# Patient Record
Sex: MALE | Race: WHITE | NOT HISPANIC OR LATINO | URBAN - METROPOLITAN AREA
[De-identification: names, ages, dates, MRNs, and addresses within clinical notes are randomized per-mention and may not be internally consistent; named-entity substitution may affect disease eponyms.]

---

## 2018-05-02 ENCOUNTER — APPOINTMENT (OUTPATIENT)
Dept: URBAN - METROPOLITAN AREA SURGERY 9 | Age: 28
Setting detail: DERMATOLOGY
End: 2018-05-02

## 2018-05-02 DIAGNOSIS — B07.8 OTHER VIRAL WARTS: ICD-10-CM

## 2018-05-02 DIAGNOSIS — L72.0 EPIDERMAL CYST: ICD-10-CM

## 2018-05-02 DIAGNOSIS — A63.0 ANOGENITAL (VENEREAL) WARTS: ICD-10-CM

## 2018-05-02 PROBLEM — L20.84 INTRINSIC (ALLERGIC) ECZEMA: Status: ACTIVE | Noted: 2018-05-02

## 2018-05-02 PROBLEM — L40.0 PSORIASIS VULGARIS: Status: ACTIVE | Noted: 2018-05-02

## 2018-05-02 PROCEDURE — 17110 DESTRUCT B9 LESION 1-14: CPT

## 2018-05-02 PROCEDURE — OTHER LIQUID NITROGEN: OTHER

## 2018-05-02 PROCEDURE — 99202 OFFICE O/P NEW SF 15 MIN: CPT | Mod: 25

## 2018-05-02 PROCEDURE — OTHER COUNSELING: OTHER

## 2018-05-02 PROCEDURE — OTHER PRESCRIPTION: OTHER

## 2018-05-02 RX ORDER — IMIQUIMOD 50 MG/G
CREAM TOPICAL
Qty: 12 | Refills: 2 | Status: ERX | COMMUNITY
Start: 2018-05-02

## 2018-05-02 ASSESSMENT — LOCATION DETAILED DESCRIPTION DERM
LOCATION DETAILED: RIGHT PROXIMAL PALMAR SMALL FINGER
LOCATION DETAILED: RIGHT DORSAL SHAFT OF PENIS
LOCATION DETAILED: RIGHT MEDIAL TRAPEZIAL NECK

## 2018-05-02 ASSESSMENT — LOCATION SIMPLE DESCRIPTION DERM
LOCATION SIMPLE: POSTERIOR NECK
LOCATION SIMPLE: PENIS
LOCATION SIMPLE: RIGHT SMALL FINGER

## 2018-05-02 ASSESSMENT — LOCATION ZONE DERM
LOCATION ZONE: FINGER
LOCATION ZONE: NECK
LOCATION ZONE: PENIS

## 2018-05-02 NOTE — PROCEDURE: LIQUID NITROGEN
Medical Necessity Clause: Medical necessity:
Number Of Freeze-Thaw Cycles: 1 freeze-thaw cycle
Post-Care Instructions: Reviewed in detail post-care instructions.
Add 52 Modifier (Optional): no
Medical Necessity Information: It is in your best interest to select a reason for this procedure from the list below. All of these items fulfill various CMS LCD requirements except the new and changing color options.
Detail Level: Simple
Consent: Informed consent obtained including but not limited to risks of pain, blistering, possibly permanent pigmentary change, recurrence and incomplete removal.

## 2018-06-29 ENCOUNTER — APPOINTMENT (OUTPATIENT)
Dept: URBAN - METROPOLITAN AREA SURGERY 9 | Age: 28
Setting detail: DERMATOLOGY
End: 2018-06-29

## 2018-06-29 DIAGNOSIS — A63.0 ANOGENITAL (VENEREAL) WARTS: ICD-10-CM

## 2018-06-29 DIAGNOSIS — L60.3 NAIL DYSTROPHY: ICD-10-CM

## 2018-06-29 DIAGNOSIS — L72.0 EPIDERMAL CYST: ICD-10-CM

## 2018-06-29 DIAGNOSIS — B07.8 OTHER VIRAL WARTS: ICD-10-CM

## 2018-06-29 PROCEDURE — OTHER COUNSELING: OTHER

## 2018-06-29 PROCEDURE — 99213 OFFICE O/P EST LOW 20 MIN: CPT

## 2018-06-29 PROCEDURE — OTHER OTHER: OTHER

## 2018-06-29 ASSESSMENT — LOCATION DETAILED DESCRIPTION DERM
LOCATION DETAILED: RIGHT DISTAL ULNAR PALMAR SMALL FINGER
LOCATION DETAILED: RIGHT LATERAL TRAPEZIAL NECK

## 2018-06-29 ASSESSMENT — LOCATION SIMPLE DESCRIPTION DERM
LOCATION SIMPLE: RIGHT SMALL FINGER
LOCATION SIMPLE: POSTERIOR NECK

## 2018-06-29 ASSESSMENT — LOCATION ZONE DERM
LOCATION ZONE: FINGER
LOCATION ZONE: NECK

## 2018-06-29 NOTE — HPI: CYST
Is This A New Presentation, Or A Follow-Up?: Follow Up Cyst
Additional History: \\n\\nPatient reports the affected area is now “bothersome.” He stated it now is black and blue.

## 2018-06-29 NOTE — PROCEDURE: OTHER
Other (Free Text): Obtain Wart Stick and use nightly until resolved, consider LN2 again if no better in 2 months
Detail Level: Detailed
Note Text (......Xxx Chief Complaint.): This diagnosis correlates with the
Other (Free Text): Not examined today, use imiquimod 3x a week, up to daily. Has not used much since last visit due to travels
Other (Free Text): May be traumatic in origin or due to fungus. Will try fungi Nail or OTC antifungal cream (Lamisil or Lotrimin ultra) nightly under affected nail edges, consider clipping or culture if no better in 2 months to confirm fungus.

## 2025-01-21 NOTE — PROCEDURE: COUNSELING
2nd outreach attempt. Called and left message for parent/guardian to return a call to [phone] regarding [facility] appt for Medication Management . Please transfer call to Psych Support Services for assistance.  
Detail Level: Detailed
Received call from POD with parent/guardian regarding [facility]. Writer scheduled Medication Management  [facility] for 3/4/25 at 2:30pm with Dr. Farnaz Gutiérrez, due to patient request.  
Patient Specific Counseling (Will Not Stick From Patient To Patient): Po Abx declined, he will call in 2 months or so when the inflammation has resolved (lesion no longer is spongy or looks red) and schedule a 30 min exc appt.